# Patient Record
Sex: FEMALE | Race: WHITE
[De-identification: names, ages, dates, MRNs, and addresses within clinical notes are randomized per-mention and may not be internally consistent; named-entity substitution may affect disease eponyms.]

---

## 2017-03-26 ENCOUNTER — HOSPITAL ENCOUNTER (INPATIENT)
Dept: HOSPITAL 1 - ED | Age: 82
LOS: 2 days | Discharge: HOME HEALTH | DRG: 641 | End: 2017-03-28
Attending: FAMILY MEDICINE | Admitting: FAMILY MEDICINE
Payer: COMMERCIAL

## 2017-03-26 VITALS — SYSTOLIC BLOOD PRESSURE: 155 MMHG | DIASTOLIC BLOOD PRESSURE: 53 MMHG

## 2017-03-26 VITALS — BODY MASS INDEX: 30.77 KG/M2 | WEIGHT: 162.99 LBS | HEIGHT: 61 IN

## 2017-03-26 VITALS — DIASTOLIC BLOOD PRESSURE: 54 MMHG | SYSTOLIC BLOOD PRESSURE: 108 MMHG

## 2017-03-26 DIAGNOSIS — I42.9: ICD-10-CM

## 2017-03-26 DIAGNOSIS — M85.88: ICD-10-CM

## 2017-03-26 DIAGNOSIS — G90.9: ICD-10-CM

## 2017-03-26 DIAGNOSIS — I10: ICD-10-CM

## 2017-03-26 DIAGNOSIS — F17.210: ICD-10-CM

## 2017-03-26 DIAGNOSIS — E66.8: ICD-10-CM

## 2017-03-26 DIAGNOSIS — F41.8: ICD-10-CM

## 2017-03-26 DIAGNOSIS — N39.0: ICD-10-CM

## 2017-03-26 DIAGNOSIS — K21.9: ICD-10-CM

## 2017-03-26 DIAGNOSIS — E86.0: Primary | ICD-10-CM

## 2017-03-26 DIAGNOSIS — E78.5: ICD-10-CM

## 2017-03-26 DIAGNOSIS — E03.9: ICD-10-CM

## 2017-03-26 LAB
ALBUMIN SERPL-MCNC: 3.5 G/DL (ref 3.4–5)
ALP SERPL-CCNC: 60 U/L (ref 46–116)
ALT SERPL-CCNC: 24 U/L (ref 14–59)
AMYLASE SERPL-CCNC: 71 U/L (ref 25–115)
AST SERPL-CCNC: 18 U/L (ref 15–37)
BASOPHILS NFR BLD: 0.6 % (ref 0–2)
BILIRUB SERPL-MCNC: 0.5 MG/DL (ref 0.2–1)
BUN SERPL-MCNC: 19 MG/DL (ref 7–18)
CALCIUM SERPL-MCNC: 8.8 MG/DL (ref 8.5–10.1)
CHLORIDE SERPL-SCNC: 107 MMOL/L (ref 98–107)
CHOLEST SERPL-MCNC: 155 MG/DL (ref ?–200)
CHOLEST SERPL-MCNC: 160 MG/DL (ref ?–200)
CHOLEST/HDLC SERPL: 3.2 MG/DL
CO2 SERPL-SCNC: 22.9 MMOL/L (ref 21–32)
CREAT SERPL-MCNC: 1 MG/DL (ref 0.6–1)
ERYTHROCYTE [DISTWIDTH] IN BLOOD BY AUTOMATED COUNT: 15.2 % (ref 11.5–14.5)
GFR SERPLBLD BASED ON 1.73 SQ M-ARVRAT: (no result) ML/MIN
GLUCOSE SERPL-MCNC: 100 MG/DL (ref 74–106)
HDLC SERPL-MCNC: 49 MG/DL (ref 40–60)
HDLC SERPL-MCNC: 50 MG/DL (ref 40–60)
LIPASE SERPL-CCNC: 133 IU/L (ref 73–393)
MAGNESIUM SERPL-MCNC: 2 MG/DL (ref 1.8–2.4)
MICROSCOPIC UR-IMP: YES
PLATELET # BLD: 162 X10^3MCL (ref 130–400)
POTASSIUM SERPL-SCNC: 4.3 MMOL/L (ref 3.5–5.1)
PROT SERPL-MCNC: 7.8 G/DL (ref 6.4–8.2)
RBC # UR STRIP.AUTO: NEGATIVE /UL
SODIUM SERPL-SCNC: 142 MMOL/L (ref 136–145)
T3 SERPL-MCNC: 0.87 NG/ML
T3RU NFR SERPL: 36 % UPTAKE (ref 30–39)
T4 FREE SERPL-MCNC: 1.03 NG/DL (ref 0.76–1.46)
T4 SERPL-MCNC: 6.9 UG/DL (ref 4.7–13.3)
T4/T3 UPTAKE INDEX SERPL: 2.5 UG/DL (ref 1.4–4.5)
TRIGL SERPL-MCNC: 145 MG/DL (ref ?–150)
UA SPECIFIC GRAVITY: 1.01 (ref 1–1.03)

## 2017-03-26 NOTE — NUR
EMT TO ROADTEST PT FOR GAIT. WHEN PT GOT UP OUT OF BED SHE STATED SHE FELT
DIZZY DR MCWILLIAMS MADE AWARE.

## 2017-03-26 NOTE — NUR
PT MEDICATED WITH ANTIVERT 25MG PO, AND XOFRAN 4MG SLOW IV PUSH PER MD ORDERS.
PT EDUCATED ON MEDICATION PRIOR TO ADMINISTRATION AND VERBALIZED UNDERSTANDING.
PT REMAINS ON CARDIAC MONITOR WITH CALL LIGHT IN REACH

## 2017-03-26 NOTE — NUR
AAO X 4. SPEECH CLEAR AND APPROPRIATE. USED [employer],
PT STATED STILL HAVING DIZZINESS BUT LESS SO THAN ON ADMISSION. INSTRUCTED TO
USE CALL LIGHT TO CALL FOR ASSISTANCE IF NEEDING TO GET OUT OF BED. IVF OF NS
AT 50ML/HR.

## 2017-03-26 NOTE — NUR
PT ARRIVE TO UNIT VIA GOURNEY ACCOMPANIED BY DAUGHTER. PT AOX4 ASSIST CLIENT
TO THE BED, CLIENT STATED NEEDINDG TO VOID. ASSISTED PT TO BATHROOM. PT STATED
FEELING DIZZY. ASSISTED PT TO THE BED. APPLIED FALL ID,ALLERGY AND FALL RISK
BRACELET. ASSESSMENT MADE HR WNL, CAP REFIL <3 SECOND RR EVEN AND UNLABORED PT
AMBULATES, CONTINENT LAST BM 3/26/17 SKIN IS DRY WARM TO TOUCH. 20G ON LEFT
WRIST PATENT AND INTACT. PT VERBALIZED HEADACHE 7/10. CALL LIGHT WITHIN REACH
WILL CONTINUE TO MONITOR FOR CHANGES.

## 2017-03-26 NOTE — NUR
PT SLEEPING ON GURNEY IN A POSITION OF COMFORT. PT REMAINS ON CARDIAC MONITOR
WITH CALL LIGHT IN REACH.

## 2017-03-26 NOTE — NUR
PT TO ED WITH DAUGHTER IN [place] FOR C/O NUMBNESS. PER DAUGHTER PT WOKE UP
YESTERDAY MORNING AND TOLD HER THAT ALL NIGHT SHE HAD NUMBNESS AND TINGLING TO
THE LEFT SIDE OF HER FACE AND HEAD. DAUGHTER STS PT HAD LEFT SIDED FACIAL
PARALYSIS YESTERDAY. DAUGHTER REPORTS DIZZINESS X2 DAYS. PT IS ALERT AND
ORIENTED SPEAKING IN CLEAR COMPLETE SENTENCES. PT HAS HEADACHE FROM FOREHEAD
BACK AND DESCRIBES IT AS TINGLING. PT IS STILL HAVING NUMBNESS NOTED TO LEFT
SIDE OF FACE, NO FACIAL DROOP NOTED. PTS BREATHING IS EVEN AND UNLABORED. PTS
SKIN IS WARM AND DRY TO TOUCH. PT REPORTS RECENT NAUSEA NO VOMITING. PT IS
PLACED ON CARDIAC MONITOR WITH CALL LIGHT IN REACH FAMILY AT BEDSIDE AND DR MCWILLIAMS AT BEDSIDE FOR MSE

## 2017-03-26 NOTE — NUR
PT MEDICATED WITH ASPIRIN 325MG PO PER MD ORDERS. PT AND FAMILY EDUCATED ON
MEDICATION PRIOR TO ADMINISTRATION AND VERBALIZED UNDERSTANDING. PT REMAINS ON
CARDIAC MONITOR WITH CALL LIGHT IN REACH AND FAMILY AT BEDSIDE.

## 2017-03-26 NOTE — NUR
PT RESTING COMFORTABLY ON BED,IN NO DISTRESS,DENIES PAIN,BEDSIDE ENDORSEMENT
DONE WITH NOC NURSE LB.

## 2017-03-26 NOTE — NUR
PT RETURNED FROM CT VIA GURNEY, PLACED BACK ON CARDIAC MONITOR WITH CALL LIGHT
IN REACH AND FAMILY AT BEDSIDE. X-RAY AT BEDSIDE

## 2017-03-26 NOTE — NUR
PT RESTING ON GURNEY IN A POSITION OF COMFORT. PT STS HER DIZZINESS AND NAUSEA
HAS GONE AWAY AT THIS TIME. PT REMAINS ON CARDIAC MONITOR WITH CALL LIGHT IN
REACH AND FAMILY AT BEDSIDE.

## 2017-03-27 VITALS — DIASTOLIC BLOOD PRESSURE: 60 MMHG | SYSTOLIC BLOOD PRESSURE: 144 MMHG

## 2017-03-27 VITALS — SYSTOLIC BLOOD PRESSURE: 120 MMHG | DIASTOLIC BLOOD PRESSURE: 69 MMHG

## 2017-03-27 VITALS — SYSTOLIC BLOOD PRESSURE: 124 MMHG | DIASTOLIC BLOOD PRESSURE: 66 MMHG

## 2017-03-27 LAB
BASOPHILS NFR BLD: 0.3 % (ref 0–2)
BUN SERPL-MCNC: 19 MG/DL (ref 7–18)
CALCIUM SERPL-MCNC: 8.9 MG/DL (ref 8.5–10.1)
CHLORIDE SERPL-SCNC: 109 MMOL/L (ref 98–107)
CO2 SERPL-SCNC: 22.9 MMOL/L (ref 21–32)
CREAT SERPL-MCNC: 1 MG/DL (ref 0.6–1)
ERYTHROCYTE [DISTWIDTH] IN BLOOD BY AUTOMATED COUNT: 15.1 % (ref 11.5–14.5)
GFR SERPLBLD BASED ON 1.73 SQ M-ARVRAT: (no result) ML/MIN
GLUCOSE SERPL-MCNC: 95 MG/DL (ref 74–106)
MAGNESIUM SERPL-MCNC: 2 MG/DL (ref 1.8–2.4)
PHOSPHATE SERPL-MCNC: 3.8 MG/DL (ref 2.5–4.9)
PLATELET # BLD: 232 X10^3MCL (ref 130–400)
POTASSIUM SERPL-SCNC: 4.3 MMOL/L (ref 3.5–5.1)
SODIUM SERPL-SCNC: 144 MMOL/L (ref 136–145)

## 2017-03-27 NOTE — NUR
SITTING ON BED, AWAKE AND ALERT. BREATHING EVEN AND UNLABORED. STATED WANTS TO
GO HOME TODAY BECAUSE IT IS HER BIRTHDAY TOMORROW. DENIES HAVING HEADACHE OR
DIZZINESS.

## 2017-03-27 NOTE — NUR
PT IS ALERT AND ORIENTED. PLEASANT AND COOPERATIVE. Arabic SPEAKING AND
ENGLISH. ROOM AIR. LUNGS CLEAR ON AUSCULTAITONS BILATERALLY UPPER AND LOWER
BASES. STILL HAS IV NS AT 50 ML PER HOUR INFUSING WELL IN THE RIGHT AC
PATENT AND INTACT. STILL GETTING ROCEPHIN IVPB FOR UTI. DAILY. NO ADVERSE
REACTION NOTED.DENIES HEADACHE.

## 2017-03-27 NOTE — NUR
EYES CLOSED, BREATHING EVEN AND UNLABORED. CALL LIGHT WITHIN EASY REACH. HOB
KEPT ELEVATED 30 DEG. CALL LIGHT WITHIN EASY REACH.

## 2017-03-27 NOTE — NUR
SITTING UP ON SIDE OF BED. EARLIER AMBULATED TO RESTROOM AND STATED SHE
VOIDED. DENIES HAVING DIZZINESS, STATED HAD HEADACHE, TYLENOL 650MG PO WAS
ADMINISTERED. IVF OF NS AT 50ML/HR.

## 2017-03-27 NOTE — NUR
AAO X4.MOSTLY Turkmen.DENIES ANY PAIN/DISCOMFOR.PT NON-TELE.IVF NS GOING AT 50
ML/HR INFUSING WELL.CALL LIGHT WITHIN REACH.INSTRUCTED TO CALL FOR ANY
PAIN/DISCOMFORT.WILL CONTINUE TO MONITOR PT.

## 2017-03-27 NOTE — NUR
***PT NOTE***
 
[id number]
Pt IS AN 80 Y/O FEMALE ADMITTED DUE TO DIZZINESS; DX WITH D/O ANS, R/O CVA.
CT HEAD- NO ACUTE IC ABN; US CAROTIDS- NO SIG STENOSIS
PMH: HTN, HLD, GERD, ANXIETY, FALL
PSH: R SHOUDLER SOFT TISSUE REPAIR
Pt LIVES WITH GRANDDAUGHTER IN A 2-SH, BED DOWNSTAIRS; WAS INDEP IN ALL ADLs
AND AMBULATION WITHOUT ASSISTIVE DEVICE; DME: FWW
Pt WAS CLEARED FOR PT PER RN; Pt WAS FOUND AWAKE SITTING ON A CHAIR BY
BEDSIDE; SPEAKS Palestinian BUT WAS ABLE TO FOLLOW INSTRUCTIONS IN SIMPLE
ENGLISH AND HAND GESTURES; AGREED TO PARTICIPATE W/PT.
S:DENIES PAIN
O:SaO2 ON ROOM AIR AT REST 96%, HR 67.
BED MOBILITY: NT
TRANSFERS: SIT-STAND CGA
STANDING BALANCE G-/F+
GAIT 150 FT FWW CGA; NOTED SLOW JAJA BUT WITH STEADY GAIT USING FWW;
NO SIG DIZZINESS REPORTED DURING GAIT; NO LOB
Pt WAS ASSISTED BACK TO ROOM, PREFERS TO SIT ON THE CHAIR BY BEDSIDE; IV
LINE INTACT, TABLE PLACED WITHIN REACH, CALL LIGHT ON HAND. Pt
APPRECIATED CARE. RN NOTIFIED.
A:Pt DEMONSTRATES WEAKNESS, DECREASED BALANCE/GAIT SAFETY; FALL RISK. Pt
WAS INSTRUCTED ON PROPER HAND PLACEMENT FOR SAFE TRANSFERS, ABLE TO
RETURN DEMO.
P:POC TO INCLUDE THEREX, THERACT, GAIT TRAINING, BALANCE EX, SAFETY EDUC;
ONCE DAILY 6X/WK X1 WEEK. HHPT POST ACUTE IS RECOMMENDED. DX AND POC
DISCUSSED W/PTA.
 
[id number]
[id number], [id number], TUG SCORE=11sec
 
[id number]
THEREX FOR BOTH LE PERFORMED SITTING INCLUDING LAQ, SEATED MARCHING, HIP ABD,
AND ANKLE PUMPS; X 10 REPS EACH.
THEREX8

## 2017-03-27 NOTE — NUR
AND MEDICINE TEAM AT BEDSIDE.INFORMED PT ABOUT THE PLAN OF
CARE. AT BEDSIDE.PT WILL STAY FOR  TO SEE PATIENT.PT
COOPERATIVE WITH THE PLAN OF CARE

## 2017-03-28 VITALS — DIASTOLIC BLOOD PRESSURE: 68 MMHG | SYSTOLIC BLOOD PRESSURE: 138 MMHG

## 2017-03-28 VITALS — SYSTOLIC BLOOD PRESSURE: 141 MMHG | DIASTOLIC BLOOD PRESSURE: 74 MMHG

## 2017-03-28 VITALS — SYSTOLIC BLOOD PRESSURE: 127 MMHG | DIASTOLIC BLOOD PRESSURE: 59 MMHG

## 2017-03-28 NOTE — NUR
AAO X4.MOSTLY Occitan.DENIES ANY PAIN/DISCOMFORT.LUNGS CLEAR.PT NON-TELEIVF NS
GOING AT 50 ML/HR INFUSING WELL.CALL LIGHT WITHIN REACH.INSTRUCTED TO CALL FOR
ANY PAIN/DISCOMFORT.WILL CONTIUE TO MONITOR PT.

## 2017-03-28 NOTE — NUR
***PT NOTES***
TIME [id number]
S: CLEARED BY RN FOR P.T. TX. PATIENT IS AWAKE & ALERT SITTING UP IN A CHAIR
BY BEDSIDE. AGREEABLE TO P.T. TX. C/O HEADACHE.
O: VITALS AT REST /72, HR 87 BPM, SPO2 ON RA 98%.
   BED MOBILITY: N/T
   TRANSFER: SIT<>STAND W/ FWW SBA.
   GAIT: 500FT W/ FWW SBA/CGA. DECREASE GAIT VELOCITY. SLIGHT FORWARD HEAD
POSTURE W/ CUES GIVEN TO CORRECT. PATIENT HAS A STEADY GAIT W/ NO LOB
OBSERVED. EDUCATED ON SAFETY FOR FALL PREVENTION W/ GOOD UNDERSTANDING.
SEATED THER EX SHOULDER FLEXION, KNEE FLEX/EXT, LATERAL NECK FLEX AS DISHA.
PATIENT IS SAFELY & COMFORTABLY IN A SITTING UP AT EOB W/ CALL BUTTON & TABLE
IN REACH. RN NOTIFIED.
P: DISCUSSED W/ PRIMARY PHYSICAL THERAPIST
[id number]',[id number]'

## 2017-03-28 NOTE — NUR
PT WAS ABLE TO REST AFTER GIVEN NORCO FOR HEADACHE. MADE COMFORTABLE IN BED.
CALL LIGHT WITHIN EASY REASH. WILL MONITOR.

## 2017-03-28 NOTE — NUR
PT D/C TO HOME WITH HOMEHEALTH.IV D/C'D.RX AND D/C INSTRUCTION GIVEN PT
VERBALIZES UNDERSTANDING.WENT DOWN VIA WHEELCHAIR ACCOMPANIED BY DAUGHTER AND
CNA.

## 2017-10-23 ENCOUNTER — HOSPITAL ENCOUNTER (EMERGENCY)
Dept: HOSPITAL 1 - ED | Age: 82
Discharge: HOME | End: 2017-10-23
Payer: COMMERCIAL

## 2017-10-23 VITALS — SYSTOLIC BLOOD PRESSURE: 122 MMHG | DIASTOLIC BLOOD PRESSURE: 63 MMHG

## 2017-10-23 DIAGNOSIS — N39.0: Primary | ICD-10-CM

## 2017-10-23 DIAGNOSIS — I10: ICD-10-CM

## 2017-10-23 DIAGNOSIS — E78.5: ICD-10-CM

## 2017-10-23 LAB
MICROSCOPIC UR-IMP: YES
RBC # UR STRIP.AUTO: (no result) /UL
UA SPECIFIC GRAVITY: <=1.005 (ref 1–1.03)

## 2018-03-26 ENCOUNTER — HOSPITAL ENCOUNTER (EMERGENCY)
Dept: HOSPITAL 1 - ED | Age: 83
Discharge: HOME | End: 2018-03-26
Payer: COMMERCIAL

## 2018-03-26 VITALS
HEIGHT: 60.98 IN | BODY MASS INDEX: 33.04 KG/M2 | WEIGHT: 174.98 LBS | BODY MASS INDEX: 33.04 KG/M2 | HEIGHT: 60.98 IN | WEIGHT: 174.98 LBS

## 2018-03-26 VITALS — SYSTOLIC BLOOD PRESSURE: 134 MMHG | DIASTOLIC BLOOD PRESSURE: 75 MMHG

## 2018-03-26 DIAGNOSIS — N39.0: Primary | ICD-10-CM

## 2018-03-26 DIAGNOSIS — I10: ICD-10-CM

## 2018-03-26 DIAGNOSIS — Z86.79: ICD-10-CM

## 2018-03-26 DIAGNOSIS — E78.5: ICD-10-CM

## 2018-03-26 DIAGNOSIS — Z88.1: ICD-10-CM
